# Patient Record
Sex: MALE | Race: WHITE | NOT HISPANIC OR LATINO
[De-identification: names, ages, dates, MRNs, and addresses within clinical notes are randomized per-mention and may not be internally consistent; named-entity substitution may affect disease eponyms.]

---

## 2022-06-21 ENCOUNTER — NON-APPOINTMENT (OUTPATIENT)
Age: 47
End: 2022-06-21

## 2022-06-21 ENCOUNTER — APPOINTMENT (OUTPATIENT)
Dept: ORTHOPEDIC SURGERY | Facility: CLINIC | Age: 47
End: 2022-06-21
Payer: COMMERCIAL

## 2022-06-21 DIAGNOSIS — M75.81 OTHER SHOULDER LESIONS, RIGHT SHOULDER: ICD-10-CM

## 2022-06-21 PROCEDURE — 99204 OFFICE O/P NEW MOD 45 MIN: CPT

## 2022-06-21 PROCEDURE — 73030 X-RAY EXAM OF SHOULDER: CPT | Mod: RT

## 2022-06-21 RX ORDER — MELOXICAM 7.5 MG/1
7.5 TABLET ORAL DAILY
Qty: 21 | Refills: 2 | Status: COMPLETED | COMMUNITY
Start: 2022-06-21 | End: 2022-08-23

## 2022-06-22 NOTE — DISCUSSION/SUMMARY
[de-identified] : SUNSHINE WILSON is a 47 year y/o male who presents for initial visit of Right (R) shoulder pain. He reports intermittent pain since past 8 days which began after he pitched. He localizes most of his pain over anterior and posterior aspect of shoulder joint. He denies numbness or tingling down to extremity. Patient is avid sportsman, and likes to stay active with baseball, football. He reports pain with lifting, and reaching for things in back. He has not done PT for this yet. He occasionally takes NSAIDs that provide relief. He reports his trap has always been stiff and has tried stretching but no relief. \par \par We had a thorough discussion regarding the nature of his pain, the pathophysiology, as well as all treatment options. Based on his clinical exam, and radiographs, he has biceps tendinitis, AC joint impingement, and mild GIRD for which I discussed operative and non-operative treatment modalities. Normal radiographs were performed and reviewed today. Based off of his symptoms of pain with weakness and no prior conservative measures tried, patient necessitates physical therapy, and an anti-inflammatory. Patient denies GI issues, HTN or DM. Meloxicam was prescribed to his pharmacy to take as directed with food. Pt was educated on negative sequelae and he will d.c if this occurs and call us at that time. He was also given a prescription for PT that he will perform 2x/week for 6-8 weeks. He will see us for clinical reassessment at that time. He agrees with the above plan and all questions were answered.\par

## 2022-06-22 NOTE — ADDENDUM
[FreeTextEntry1] : Documented by Ron Hooker acting as a scribe for Dr. Casas and Todd Joyce PA-C on 06/21/2022. \par \par All medical record entries made by the Scribe were at my, Dr. Casas, and Todd Joyce's, direction and\par personally dictated by me on 06/21/2022. I have reviewed the chart and agree that the record\par accurately reflects my personal performance of the history, physical exam, procedure and imaging.

## 2022-06-22 NOTE — PHYSICAL EXAM
[de-identified] : Physical Exam:\par General: Well appearing, no acute distress\par Neurologic: A&Ox3, No focal deficits\par Head: NCAT without abrasions, lacerations, or ecchymosis to head, face, or scalp\par Eyes: No scleral icterus, no gross abnormalities\par Respiratory: Equal chest wall expansion bilaterally, no accessory muscle use\par Lymphatic: No lymphadenopathy palpated\par Skin: Warm and dry\par Psychiatric: Normal mood and affect\par \par C-Spine\par Palpation: Tenderness to paraspinal muscles and trapezius muscle\par ROM: side bending, symmetrical. Pain with extension and flexion of the neck.\par Reflexes: C5-7 normal\par \par Right Shoulder\par ·	Inspection/Palpation: no tenderness, swelling or deformities. Trapezius stiffness. \par ·	Range of Motion: no crepitus with ROM; Active FF 0 - 170 w/ hitching ER at side 0 - 80; IR to L5. arc of motion at 90° abduction was 20° hyper external rotation and -30 degrees loss of internal rotation\par ·	Strength: forward elevation in scapular plane 5/5, internal rotation 5/5, external rotation 4+/5, adduction 5/5 and abduction 4/5 w/ hitching\par ·	Stability: no joint instability on provocative testing\par ·	Tests: Calhoun test positive, Neer negative negative drop arm test secondary to pain, bear hug test negative Napolean sign negative cross arm adduction negative lift off sign negative hornblowers sign negative speeds test negative, Yergason's test negative, Figueroa's Active Compression test negative, whipple test negative bicep's load II test negative\par \par Left Shoulder\par ·	Inspection/Palpation: no tenderness, swelling or deformities\par ·	Range of Motion: full and painless in all planes, no crepitus\par ·	Strength: forward elevation in scapular plane 5/5, internal rotation 5/5, external rotation 5/5, adduction 5/5 and abduction 5/5\par ·	Stability: no joint instability on provocative testing\par ·	Tests: Calhoun test negative, Neer sign negative, negative drop arm test secondary to pain, bear hug test negative, Napolean sign negative, cross arm adduction negative, lift off sign positive, hornblowers sign negative, speeds test negative, Yergason's test negative, no bicipital groove tenderness, Figueroa's Active Compression test negative  [de-identified] : The following radiographs were ordered and read by me during this patients visit. I reviewed each radiograph in detail with the patient and discussed the findings as highlighted below. \par \par 4 views of the Right (R) shoulder show Osteophyte formation noted, hooked acromion, AC joint impingement, no fracture, dislocation or bony lesions. There is evidence of degenerative change in the glenohumeral and acromioclavicular joints with mild narrowing of the joint space. Otherwise unremarkable.

## 2022-06-22 NOTE — HISTORY OF PRESENT ILLNESS
[Stable] : stable [___ days] : [unfilled] day(s) ago [3] : a current pain level of 3/10 [5] : an average pain level of 5/10 [de-identified] : SUNSHINE WILSON is a 47 year y/o male who presents for initial visit of Right (R) shoulder pain. He reports intermittent pain since past 8 days which began after he pitched. He localizes most of his pain over anterior and posterior aspect of shoulder joint. He denies numbness or tingling down to extremity. Patient is avid sportsman, and likes to stay active with baseball, football. He reports pain with lifting, and reaching for things in back. He has not done PT for this yet. He occasionally takes NSAIDs that provide relief. He reports his trap has always been stiff and has tried stretching but no relief.

## 2022-08-09 ENCOUNTER — APPOINTMENT (OUTPATIENT)
Dept: ORTHOPEDIC SURGERY | Facility: CLINIC | Age: 47
End: 2022-08-09